# Patient Record
Sex: MALE | Race: WHITE
[De-identification: names, ages, dates, MRNs, and addresses within clinical notes are randomized per-mention and may not be internally consistent; named-entity substitution may affect disease eponyms.]

---

## 2018-05-02 ENCOUNTER — HOSPITAL ENCOUNTER (EMERGENCY)
Dept: HOSPITAL 17 - NEPK | Age: 28
Discharge: HOME | End: 2018-05-02
Payer: SELF-PAY

## 2018-05-02 VITALS
HEART RATE: 80 BPM | RESPIRATION RATE: 18 BRPM | DIASTOLIC BLOOD PRESSURE: 63 MMHG | OXYGEN SATURATION: 100 % | SYSTOLIC BLOOD PRESSURE: 132 MMHG | TEMPERATURE: 98.3 F

## 2018-05-02 VITALS — BODY MASS INDEX: 25.38 KG/M2 | HEIGHT: 72 IN | WEIGHT: 187.39 LBS

## 2018-05-02 DIAGNOSIS — L02.415: Primary | ICD-10-CM

## 2018-05-02 PROCEDURE — 87186 SC STD MICRODIL/AGAR DIL: CPT

## 2018-05-02 PROCEDURE — 87070 CULTURE OTHR SPECIMN AEROBIC: CPT

## 2018-05-02 PROCEDURE — 99283 EMERGENCY DEPT VISIT LOW MDM: CPT

## 2018-05-02 PROCEDURE — 86403 PARTICLE AGGLUT ANTBDY SCRN: CPT

## 2018-05-02 NOTE — PD
HPI


Chief Complaint:  Skin Problem


Time Seen by Provider:  11:40


Travel History


International Travel<30 days:  No


Contact w/Intl Traveler<30days:  No


Traveled to known affect area:  No





History of Present Illness


HPI


27-year-old male presents emergency department for evaluation of what he 

believes a spider bite on his right lateral thigh.  He noticed it a week ago.  

He states it increase in size.  Yesterday it began draining and today in the 

shower he drained a large amount of purulent drainage from it.  He went to the 

pharmacy to see if he could get topical ointment and they advised he get on 

oral antibiotics.  He is here for this.  He denies any fever or chills.  States 

it is moderate pain at the site, like he is aware it is there, however it only 

really hurts when it is palpated.  He has no other symptoms to report.





PFSH


Past Medical History


Medical History:  Denies Significant Hx


Tetanus Vaccination:  < 5 Years





Past Surgical History


Surgical History:  No Previous Surgery





Social History


Alcohol Use:  No


Tobacco Use:  Yes


Substance Use:  No





Allergies-Medications


(Allergen,Severity, Reaction):  


Coded Allergies:  


     *MDRO Multi-Drug Resistant Organism (Verified  Allergy, Unknown, 5/2/18)


 MRSA


Uncoded Allergies:  


     narcotics (Adverse Reaction, Unknown, Restlessness, 5/2/18)


 recovery


Reported Meds & Prescriptions





Reported Meds & Active Scripts


Active


Bactrim DS (Sulfamethoxazole-Trimethoprim) 800-160 Mg Tab 1 Tab PO BID


Keflex (Cephalexin) 500 Mg Capsule 500 Mg PO QID 5 Days


Ibuprofen 800 Mg Tab 800 Mg PO Q8H PRN








Review of Systems


Except as stated in HPI:  all other systems reviewed are Neg





Physical Exam


Narrative


GENERAL: Well-nourished, well-developed male patient in no acute distress


SKIN: There is an indurated area in the right anterior lateral thigh which 

measures about 6 cm in diameter.  There is 1 cm opening and purulent drainage 

is able to be expressed from it.. There is a zone of inflammation around it but 

no lymphangitis.


HEAD: Normocephalic.


EYES: No scleral icterus. No injection or drainage. 


NECK: Supple, trachea midline. No JVD or lymphadenopathy.


CARDIOVASCULAR: Regular rate and rhythm without murmurs, gallops, or rubs. 


RESPIRATORY: Breath sounds equal bilaterally. No accessory muscle use.


GASTROINTESTINAL: Abdomen soft, non-tender, nondistended. 


MUSCULOSKELETAL: No cyanosis, or edema. 


BACK: Nontender without obvious deformity. No CVA tenderness.





Data


Data


Last Documented VS





Vital Signs








  Date Time  Temp Pulse Resp B/P (MAP) Pulse Ox O2 Delivery O2 Flow Rate FiO2


 


5/2/18 11:18 98.3 80 18 132/63 (86) 100   








Orders





 Orders


Wound Culture And Gram Stain (5/2/18 11:45)


Ed Discharge Order (5/2/18 11:45)








MDM


Medical Decision Making


Medical Screen Exam Complete:  Yes


Emergency Medical Condition:  Yes


Medical Record Reviewed:  Yes


Differential Diagnosis


Abscess versus erysipelas versus folliculitis versus cellulitis


Narrative Course


26-year-old male presents emergency department for evaluation of what he 

believes is a spider bite on his right anterior lateral thigh.  Physical exam 

is consistent with an abscess that is already draining.  Culture is obtained.  

Patient is counseled on care and will be started on oral antibiotics.  He is 

encouraged to follow-up with a primary care provider and return immediately 

with acute worsening of symptoms.





Diagnosis





 Primary Impression:  


 Abscess of thigh


Referrals:  


Primary Care Physician


Patient Instructions:  Abscess Incision and Drainage (DC), General Instructions





***Additional Instructions:  


Warm compresses to the affected area


Avoid squeezing the area


Start antibiotic today and take it until it is all gone


Return immediately with any acute worsening symptoms


***Med/Other Pt SpecificInfo:  Prescription(s) given


Scripts


Sulfamethoxazole-Trimethoprim (Bactrim DS) 800-160 Mg Tab


1 TAB PO BID for Infection, #20 TAB 0 Refills


   Prov: Ya Malone         5/2/18 


Cephalexin (Keflex) 500 Mg Capsule


500 MG PO QID for Infection for 5 Days, CAP 0 Refills


   Prov: Ya Malone         5/2/18 


Ibuprofen (Ibuprofen) 800 Mg Tab


800 MG PO Q8H Y for PAIN SCALE 1 TO 10, #30 TAB 0 Refills


   Prov: Ya Malone         5/2/18


Disposition:  01 DISCHARGE HOME


Condition:  Stable











Ya Malone May 2, 2018 11:49